# Patient Record
Sex: MALE | Race: BLACK OR AFRICAN AMERICAN | NOT HISPANIC OR LATINO | Employment: UNEMPLOYED | URBAN - METROPOLITAN AREA
[De-identification: names, ages, dates, MRNs, and addresses within clinical notes are randomized per-mention and may not be internally consistent; named-entity substitution may affect disease eponyms.]

---

## 2020-09-14 ENCOUNTER — HOSPITAL ENCOUNTER (OUTPATIENT)
Dept: RADIOLOGY | Facility: OTHER | Age: 25
Discharge: HOME | End: 2020-09-14
Attending: ORTHOPAEDIC SURGERY
Payer: COMMERCIAL

## 2020-09-14 ENCOUNTER — OFFICE VISIT (OUTPATIENT)
Dept: ORTHOPEDICS | Facility: CLINIC | Age: 25
End: 2020-09-14
Payer: COMMERCIAL

## 2020-09-14 ENCOUNTER — TELEPHONE (OUTPATIENT)
Dept: ORTHOPEDICS | Facility: CLINIC | Age: 25
End: 2020-09-14

## 2020-09-14 VITALS
DIASTOLIC BLOOD PRESSURE: 78 MMHG | HEIGHT: 69 IN | WEIGHT: 142 LBS | HEART RATE: 71 BPM | BODY MASS INDEX: 21.03 KG/M2 | SYSTOLIC BLOOD PRESSURE: 121 MMHG

## 2020-09-14 DIAGNOSIS — R22.32 MASS OF LEFT HAND: Primary | ICD-10-CM

## 2020-09-14 DIAGNOSIS — S68.412A: ICD-10-CM

## 2020-09-14 DIAGNOSIS — S68.412A: Primary | ICD-10-CM

## 2020-09-14 PROCEDURE — 99204 OFFICE O/P NEW MOD 45 MIN: CPT | Mod: S$GLB,,, | Performed by: ORTHOPAEDIC SURGERY

## 2020-09-14 PROCEDURE — 3008F BODY MASS INDEX DOCD: CPT | Mod: CPTII,S$GLB,, | Performed by: ORTHOPAEDIC SURGERY

## 2020-09-14 PROCEDURE — 73130 XR HAND COMPLETE 3 VIEW LEFT: ICD-10-PCS | Mod: 26,LT,, | Performed by: RADIOLOGY

## 2020-09-14 PROCEDURE — 73130 X-RAY EXAM OF HAND: CPT | Mod: 26,LT,, | Performed by: RADIOLOGY

## 2020-09-14 PROCEDURE — 99999 PR PBB SHADOW E&M-EST. PATIENT-LVL III: CPT | Mod: PBBFAC,,, | Performed by: ORTHOPAEDIC SURGERY

## 2020-09-14 PROCEDURE — 99999 PR PBB SHADOW E&M-EST. PATIENT-LVL III: ICD-10-PCS | Mod: PBBFAC,,, | Performed by: ORTHOPAEDIC SURGERY

## 2020-09-14 PROCEDURE — 99204 PR OFFICE/OUTPT VISIT, NEW, LEVL IV, 45-59 MIN: ICD-10-PCS | Mod: S$GLB,,, | Performed by: ORTHOPAEDIC SURGERY

## 2020-09-14 PROCEDURE — 3008F PR BODY MASS INDEX (BMI) DOCUMENTED: ICD-10-PCS | Mod: CPTII,S$GLB,, | Performed by: ORTHOPAEDIC SURGERY

## 2020-09-14 PROCEDURE — 73130 X-RAY EXAM OF HAND: CPT | Mod: TC,FY,LT

## 2020-09-14 NOTE — TELEPHONE ENCOUNTER
Called patient 2x, unable to LVM. Sent portal message regarding no insurance coverage.    Chioma Palacios MS, OT  Orthopedic Clinical Assistant to Dr. Ross Dunbar [facility]

## 2020-09-14 NOTE — PROGRESS NOTES
"[facility]  History & Physical  Orthopedics    SUBJECTIVE:      COVID-19 attestation:  This patient was treated during the COVID-19 pandemic.  This was discussed with the patient, they are aware of our current policies and procedures, were given the option of delaying their visit and or switching to a virtual visit, delaying their surgery when applicable, and they elect to proceed.    Chief Complaint: left first webspace mass     Referring Provider: Self, Aaareferral     History of Present Illness:  Patient is a 25 y.o. right hand dominant male who presents today with complaints of left first webspace swelling. Patient endorses an atraumatic swelling over the first webspace of left hand over past 2 years with slow progression. Endorses limitations in thumb ROM and paraesthesias involving the ulnar aspect of his left thumb that recently began over the past few months. Denies any pain from this mass. Denies any constitutional or B symptoms including denial of any recent weightloss or weightgain. He has not tried any treatments for this issue and does endorse seeing a doctor 4 years ago that he recognized a "spur" of bone over the thumb metacarpal that he endorsed doing nothing about, but he endorses that there was no swelling or palpable mass in his L 1st webspace at that time.     The patient is a/an pianist/huerta.    Onset of symptoms/DOI was 2 years ago.    Symptoms are aggravated by activity.    Symptoms are alleviated by none.    Symptoms consist of swelling and decreased ROM.    The patient rates their pain as a 0/10.    Attempted treatment(s) and/or interventions include rest.     The patient denies any fevers, chills, N/V, D/C and presents for evaluation.       No past medical history on file.  No past surgical history on file.  Review of patient's allergies indicates:  No Known Allergies  Social History     Social History Narrative    Not on file     No family history on file.    No " "current outpatient medications on file.      Review of Systems:  Constitutional: no fever or chills  Eyes: no visual changes  ENT: no nasal congestion or sore throat  Respiratory: no cough or shortness of breath  Cardiovascular: no chest pain  Gastrointestinal: no nausea or vomiting, tolerating diet  Musculoskeletal: decreased ROM    OBJECTIVE:      Vital Signs (Most Recent):  Vitals:    09/14/20 1140   BP: 121/78   Pulse: 71   Weight: 64.4 kg (142 lb)   Height: 5' 9" (1.753 m)     Body mass index is 20.97 kg/m².      Physical Exam:  Constitutional: The patient appears well-developed and well-nourished. No distress.   Head: Normocephalic and atraumatic.   Nose: Nose normal.   Eyes: Conjunctivae and EOM are normal.   Neck: No tracheal deviation present.   Cardiovascular: Normal rate and intact distal pulses.    Pulmonary/Chest: Effort normal. No respiratory distress.   Abdominal: There is no guarding.   Neurological: The patient is alert.   Psychiatric: The patient has a normal mood and affect.     Left Hand/Wrist Examination:    Observation/Inspection:  Swelling  See below  Deformity  See below  Discoloration  none     Scars   none    Atrophy  None  Hard palpable lobulated mass nearly 7cm in diameter involving the entire 1st webspace that is nonTTP. This mass doesn't jordyn and doesn't transilluminate on light exam. There is no crepitus on exam. He is able to interdigitate his thumb and his SF, however is unable to have his thumb touch the flexion crease of the MCPJ of SF. He does have paraesthesias over the ulnar aspect of his thumb. Intact EPL/FPL/Adductor pollicis and full without pain.     HAND/WRIST EXAMINATION:  Finkelstein's Test   Neg  WHAT Test    Neg  Snuff box tenderness   Neg  Pineda's Test    Neg  Hook of Hamate Tenderness  Neg  CMC grind    Neg  Circumduction test   Neg    Neurovascular Exam:  Digits WWP, brisk CR < 3s throughout  NVI motor/LTS to M/R/U nerves, radial pulse 2+  Tinel's Test - Carpal " Tunnel  Neg  Tinel's Test - Cubital Tunnel  Neg  Phalen's Test    Neg  Median Nerve Compression Test Neg    ROM hand/wrist/elbow full, painless    RRR  CTAB  Abd S/NT/ND +BS    Diagnostic Results:     Xray - left hand with soft tissue mass and coarse calcification apparently emanating from the 1st metacarpal into the 1st-2nd metacarpal interspace  EMG - none    ASSESSMENT/PLAN:      Matt Encinas is a 25 y.o. male with soft tissue mass involving the L hand first webspace concerning for bony oncologic pathology.   Plan:  The size and progression of the patients mass in his L hand was discussed with the patient and the concern for possible cancer was brought up. Discussed with the patient that an MRI is indicated in this case to better characterize the mass. Will plan for patient to RTC upon completion of his MRI. Will also plan to discuss these findings with Dr. Jaime Dumas whom is an orthopedic oncologist involved in the [facility] practice upon completion if there is concern for oncologic pathology.   1) MRI L hand w/ and w/out contrast ordered  2) RTC upon completion of MRI      Sahil Gunter M.D.     Please be aware that this note has been generated with the assistance of honey voice-to-text.  Please excuse any spelling or grammatical errors.

## 2020-09-15 ENCOUNTER — TELEPHONE (OUTPATIENT)
Dept: ORTHOPEDICS | Facility: CLINIC | Age: 25
End: 2020-09-15

## 2020-09-15 NOTE — TELEPHONE ENCOUNTER
Attempted to call patient to arrange for a sooner MRI than currently scheduled.  We will continue to try and move his MRI to a sooner date.  No message left, voicemail full.

## 2020-09-19 ENCOUNTER — HOSPITAL ENCOUNTER (OUTPATIENT)
Dept: RADIOLOGY | Facility: HOSPITAL | Age: 25
Discharge: HOME | End: 2020-09-19
Attending: ORTHOPAEDIC SURGERY
Payer: COMMERCIAL

## 2020-09-19 ENCOUNTER — HOSPITAL ENCOUNTER (OUTPATIENT)
Dept: RADIOLOGY | Facility: HOSPITAL | Age: 25
Discharge: HOME | End: 2020-09-19
Attending: STUDENT IN AN ORGANIZED HEALTH CARE EDUCATION/TRAINING PROGRAM
Payer: COMMERCIAL

## 2020-09-19 DIAGNOSIS — R22.32 MASS OF LEFT HAND: ICD-10-CM

## 2020-09-19 PROCEDURE — 71250 CT THORAX DX C-: CPT | Mod: TC

## 2020-09-19 PROCEDURE — A9585 GADOBUTROL INJECTION: HCPCS | Performed by: STUDENT IN AN ORGANIZED HEALTH CARE EDUCATION/TRAINING PROGRAM

## 2020-09-19 PROCEDURE — 73220 MRI UPPR EXTREMITY W/O&W/DYE: CPT | Mod: TC,LT

## 2020-09-19 PROCEDURE — 73200 CT UPPER EXTREMITY W/O DYE: CPT | Mod: 26,LT,, | Performed by: RADIOLOGY

## 2020-09-19 PROCEDURE — 73220 MRI UPPR EXTREMITY W/O&W/DYE: CPT | Mod: 26,LT,, | Performed by: RADIOLOGY

## 2020-09-19 PROCEDURE — 71250 CT THORAX DX C-: CPT | Mod: 26,,, | Performed by: RADIOLOGY

## 2020-09-19 PROCEDURE — 25500020 PHARM REV CODE 255: Performed by: STUDENT IN AN ORGANIZED HEALTH CARE EDUCATION/TRAINING PROGRAM

## 2020-09-19 PROCEDURE — 73200 CT HAND WITHOUT CONTRAST LEFT: ICD-10-PCS | Mod: 26,LT,, | Performed by: RADIOLOGY

## 2020-09-19 PROCEDURE — 73220 MRI HAND FINGERS W WO CONTRAST LEFT: ICD-10-PCS | Mod: 26,LT,, | Performed by: RADIOLOGY

## 2020-09-19 PROCEDURE — 71250 CT CHEST WITHOUT CONTRAST: ICD-10-PCS | Mod: 26,,, | Performed by: RADIOLOGY

## 2020-09-19 PROCEDURE — 73200 CT UPPER EXTREMITY W/O DYE: CPT | Mod: TC,LT

## 2020-09-19 RX ORDER — GADOBUTROL 604.72 MG/ML
7 INJECTION INTRAVENOUS
Status: COMPLETED | OUTPATIENT
Start: 2020-09-19 | End: 2020-09-19

## 2020-09-19 RX ADMIN — GADOBUTROL 7 ML: 604.72 INJECTION INTRAVENOUS at 09:09

## 2020-09-21 ENCOUNTER — OFFICE VISIT (OUTPATIENT)
Dept: ORTHOPEDICS | Facility: CLINIC | Age: 25
End: 2020-09-21
Payer: COMMERCIAL

## 2020-09-21 VITALS
DIASTOLIC BLOOD PRESSURE: 71 MMHG | SYSTOLIC BLOOD PRESSURE: 109 MMHG | HEIGHT: 69 IN | WEIGHT: 142 LBS | BODY MASS INDEX: 21.03 KG/M2 | HEART RATE: 61 BPM

## 2020-09-21 DIAGNOSIS — C41.9 CHONDROSARCOMA: Primary | ICD-10-CM

## 2020-09-21 PROCEDURE — 99214 PR OFFICE/OUTPT VISIT, EST, LEVL IV, 30-39 MIN: ICD-10-PCS | Mod: S$GLB,,, | Performed by: ORTHOPAEDIC SURGERY

## 2020-09-21 PROCEDURE — 3008F PR BODY MASS INDEX (BMI) DOCUMENTED: ICD-10-PCS | Mod: CPTII,S$GLB,, | Performed by: ORTHOPAEDIC SURGERY

## 2020-09-21 PROCEDURE — 99999 PR PBB SHADOW E&M-EST. PATIENT-LVL III: CPT | Mod: PBBFAC,,, | Performed by: ORTHOPAEDIC SURGERY

## 2020-09-21 PROCEDURE — 99214 OFFICE O/P EST MOD 30 MIN: CPT | Mod: S$GLB,,, | Performed by: ORTHOPAEDIC SURGERY

## 2020-09-21 PROCEDURE — 99999 PR PBB SHADOW E&M-EST. PATIENT-LVL III: ICD-10-PCS | Mod: PBBFAC,,, | Performed by: ORTHOPAEDIC SURGERY

## 2020-09-21 PROCEDURE — 3008F BODY MASS INDEX DOCD: CPT | Mod: CPTII,S$GLB,, | Performed by: ORTHOPAEDIC SURGERY

## 2020-09-21 NOTE — PROGRESS NOTES
[facility]  History & Physical  Orthopedics    SUBJECTIVE:      COVID-19 attestation:  This patient was treated during the COVID-19 pandemic.  This was discussed with the patient, they are aware of our current policies and procedures, were given the option of delaying their visit and or switching to a virtual visit, delaying their surgery when applicable, and they elect to proceed.    Chief Complaint: left first webspace mass     Referring Provider: No ref. provider found     History of Present Illness:  Patient is a 25 y.o. right hand dominant male who presents today with complaints of left first webspace swelling. Patient endorses an atraumatic swelling over the first webspace of left hand over past 2-3 years with slow progression. Endorses limitations in thumb ROM and paraesthesias involving the ulnar aspect of his left thumb that recently began over the past few months. Denies any pain from this mass. Denies any constitutional or B symptoms including denial of any recent weightloss or weightgain. He has not tried any treatments for this issue.  He was recently referred for advanced imaging and presents today for these results and follow-up.  He denies any interval changes since his last evaluation.    The patient is a/an pianist/huerta.    Onset of symptoms/DOI was 2 years ago.    Symptoms are aggravated by activity.    Symptoms are alleviated by none.    Symptoms consist of swelling and decreased ROM.    The patient rates their pain as a 0/10.    Attempted treatment(s) and/or interventions include rest.     The patient denies any fevers, chills, N/V, D/C and presents for evaluation.       No past medical history on file.  No past surgical history on file.  Review of patient's allergies indicates:  No Known Allergies  Social History     Social History Narrative    Not on file     No family history on file.    No current outpatient medications on file.      Review of  "Systems:  Constitutional: no fever or chills  Eyes: no visual changes  ENT: no nasal congestion or sore throat  Respiratory: no cough or shortness of breath  Cardiovascular: no chest pain  Gastrointestinal: no nausea or vomiting, tolerating diet  Musculoskeletal: decreased ROM    OBJECTIVE:      Vital Signs (Most Recent):  Vitals:    09/21/20 1345   BP: 109/71   Pulse: 61   Weight: 64.4 kg (142 lb)   Height: 5' 9" (1.753 m)     Body mass index is 20.97 kg/m².      Physical Exam:  Constitutional: The patient appears well-developed and well-nourished. No distress.   Head: Normocephalic and atraumatic.   Nose: Nose normal.   Eyes: Conjunctivae and EOM are normal.   Neck: No tracheal deviation present.   Cardiovascular: Normal rate and intact distal pulses.    Pulmonary/Chest: Effort normal. No respiratory distress.   Abdominal: There is no guarding.   Neurological: The patient is alert.   Psychiatric: The patient has a normal mood and affect.     Left Hand/Wrist Examination:    Observation/Inspection:  Swelling  See below  Deformity  See below  Discoloration  none     Scars   none    Atrophy  None  Hard palpable lobulated mass nearly 7cm in diameter involving the entire 1st webspace that is nonTTP. This mass doesn't jordyn and doesn't transilluminate on light exam. There is no crepitus on exam. He is able to interdigitate his thumb and his SF, however is unable to have his thumb touch the flexion crease of the MCPJ of SF. He does have paraesthesias over the ulnar aspect of his thumb. Intact EPL/FPL/Adductor pollicis and full without pain.     HAND/WRIST EXAMINATION:  Finkelstein's Test   Neg  WHAT Test    Neg  Snuff box tenderness   Neg  Pineda's Test    Neg  Hook of Hamate Tenderness  Neg  CMC grind    Neg  Circumduction test   Neg    Neurovascular Exam:  Digits WWP, brisk CR < 3s throughout  NVI motor/LTS to M/R/U nerves, radial pulse 2+  Tinel's Test - Carpal Tunnel  Neg  Tinel's Test - Cubital Tunnel  Neg  Phalen's " Test    Neg  Median Nerve Compression Test Neg    ROM hand/wrist/elbow full, painless    RRR  CTAB  Abd S/NT/ND +BS    Diagnostic Results:     Xray - left hand with soft tissue mass and coarse calcification apparently emanating from the 1st metacarpal into the 1st-2nd metacarpal interspace    EMG - none    CT chest-  Impression:   No obvious evidence of an acute pulmonary process.   Mild emphysematous changes noted involving traction bronchiectasis.   Early development of a right-sided kidney stone measuring approximately 4.7 mm.    CT left hand-  Mass within the 1st web space concerning for malignant degeneration of an osteochondroma, other soft tissues are, not excluded.    MRI left hand with and without contrast-  Impression:   Abnormal soft tissue mass within the 1st web space concerning for possible malignant degeneration of an osteochondroma, osteosarcoma, other soft tissue sarcoma not excluded.  Further evaluation advised.      ASSESSMENT/PLAN:      Matt Encinas is a 25 y.o. male with left 1st webspace mass   Plan:  Discussion held with the patient today regarding his mass.  There is intense cartilaginous signal on his imaging and I am suspicious for a chondrosarcoma or other malignancy.  I informed him of the possibility for this mass to be cancerous.  We discussed the possibility of amputation, ray resection, the potential need for extensive reconstruction, and potentially chemotherapy/radiation.  I will refer the patient to Dr. Jaime Dumas for an urgent appointment and additional planning moving forward.  This will likely involve a multi disciplinary surgical approach.  Planning ongoing.  Will await Dr. Dumas's recommendations and additional planning pending his appointment with [facility], and I will certainly remain available for any assistance in this case.      Sahil Gunter M.D.     Please be aware that this note has been generated with the assistance of Cristina voice-to-text.  Please excuse  any spelling or grammatical errors.

## 2020-09-24 ENCOUNTER — TELEPHONE (OUTPATIENT)
Dept: ORTHOPEDICS | Facility: CLINIC | Age: 25
End: 2020-09-24

## 2020-10-30 ENCOUNTER — OFFICE VISIT (OUTPATIENT)
Dept: ORTHOPEDICS | Facility: CLINIC | Age: 25
End: 2020-10-30
Payer: COMMERCIAL

## 2020-10-30 VITALS
WEIGHT: 152.75 LBS | BODY MASS INDEX: 22.63 KG/M2 | DIASTOLIC BLOOD PRESSURE: 86 MMHG | SYSTOLIC BLOOD PRESSURE: 140 MMHG | HEIGHT: 69 IN | HEART RATE: 69 BPM

## 2020-10-30 DIAGNOSIS — C41.9 CHONDROSARCOMA: ICD-10-CM

## 2020-10-30 DIAGNOSIS — R20.2 PARESTHESIAS IN LEFT HAND: ICD-10-CM

## 2020-10-30 DIAGNOSIS — C40.00: ICD-10-CM

## 2020-10-30 DIAGNOSIS — D48.0 NEOPLASM OF UNCERTAIN BEHAVIOR OF BONE AND ARTICULAR CARTILAGE: ICD-10-CM

## 2020-10-30 DIAGNOSIS — D16.12: ICD-10-CM

## 2020-10-30 PROCEDURE — 99204 OFFICE O/P NEW MOD 45 MIN: CPT | Mod: S$GLB,,, | Performed by: ORTHOPAEDIC SURGERY

## 2020-10-30 PROCEDURE — 99999 PR PBB SHADOW E&M-EST. PATIENT-LVL III: ICD-10-PCS | Mod: PBBFAC,,, | Performed by: ORTHOPAEDIC SURGERY

## 2020-10-30 PROCEDURE — 99999 PR PBB SHADOW E&M-EST. PATIENT-LVL III: CPT | Mod: PBBFAC,,, | Performed by: ORTHOPAEDIC SURGERY

## 2020-10-30 PROCEDURE — 3008F BODY MASS INDEX DOCD: CPT | Mod: CPTII,S$GLB,, | Performed by: ORTHOPAEDIC SURGERY

## 2020-10-30 PROCEDURE — 3008F PR BODY MASS INDEX (BMI) DOCUMENTED: ICD-10-PCS | Mod: CPTII,S$GLB,, | Performed by: ORTHOPAEDIC SURGERY

## 2020-10-30 PROCEDURE — 99204 PR OFFICE/OUTPT VISIT, NEW, LEVL IV, 45-59 MIN: ICD-10-PCS | Mod: S$GLB,,, | Performed by: ORTHOPAEDIC SURGERY

## 2020-10-30 NOTE — LETTER
October 30, 2020      Sahil Gunter MD  [address]  [address]  [facility] [address]           [facility]o 3rd Fl  [address]  [facility] [address]  Phone: [phone]  Fax: [phone]          Patient: Matt Encinas   MR Number: [id number]   YOB: 1995   Date of Visit: 10/30/2020       Dear Dr. Sahil Gunter:    Thank you for referring Matt Encinas to me for evaluation. Attached you will find relevant portions of my assessment and plan of care.    If you have questions, please do not hesitate to call me. I look forward to following Matt Encinas along with you.    Sincerely,    Jaime Dumas MD    Enclosure  CC:  No Recipients    If you would like to receive this communication electronically, please contact [email or web address] or [phone] to request more information on [employer] Link access.    For providers and/or their staff who would like to refer a patient to [facility], please contact us through our one-stop-shop provider referral line, [facility], at [phone].    If you feel you have received this communication in error or would no longer like to receive these types of communications, please e-mail [email or web address]

## 2020-10-30 NOTE — PROGRESS NOTES
Subjective:     HPI:   Matt Encinas is a 25 y.o. male who presents left hand mass    Referred by colleague Dr. Gunter.     RHD, left first webspace mass present for over 2 years.  Recently however is causing limitations in thumb ROM and paraesthesias over ulnar region of L thumb.    No trauma.  No other lesions or history of MHE.    CT hand 9/19/20  FINDINGS:  There is a sizable soft tissue mass centered within the 1st web space measuring approximately 4.0 x 4.3 by 5.3 cm.  It has mixed attenuation, punctate areas of calcification.  There is an osseous spur arising from the proximal 1st metacarpal which can be seen in the setting of osteochondroma.  There is no osseous erosions.  The remainder of the visualized soft tissue and osseous structures appear within normal limits.  Impression:  Mass within the 1st web space concerning for malignant degeneration of an osteochondroma, other soft tissues are, not excluded.and MRI of the hand.    MRI hand with contrast 9/19/20  FINDINGS:  There is a lobulated multi septated mass in the 1st web space measuring 3.9 cm AP x 4.4 cm transverse by 4.8 cm craniocaudad.  Mostly I so 2 low intensity signal on T1 weighted images, mostly high intensity signal on T2 weighted images.  It demonstrates circumferential and septal enhancement.  There is no apparent signal abnormality of the 1st metacarpal bone.  Very subtle osseous edema noted at the proximal 1st metacarpal bone.  The carpal tunnel appear intact.  The flexor and extensor tendons are mostly intact.  There is no myositis.  No tenosynovitis.  Impression:   Abnormal soft tissue mass within the 1st web space concerning for possible malignant degeneration of an osteochondroma, osteosarcoma, other soft tissue sarcoma not excluded.  Further evaluation advised.     Chest CT without 9/19/20  Impression:  No obvious evidence of an acute pulmonary process.  Mild emphysematous changes noted involving traction bronchiectasis.  Early  development of a right-sided kidney stone measuring approximately 4.7 mm        ROS:  The updated medical history is in the chart and has been reviewed. A review of systems is updated and there is no reported vision changes, ear/nose/mouth/throat complaints,  chest pain, shortness of breath, abdominal pain, urological complaints, fevers or chills, psychiatric complaints. Musculoskeletal and neurologcial symptoms are as documented. All other systems are negative.  Review of Systems   Constitutional: Negative for chills, fever and weight loss.   HENT: Negative for hearing loss.    Eyes: Negative for blurred vision.   Respiratory: Negative for cough and hemoptysis.    Cardiovascular: Negative for chest pain.   Gastrointestinal: Negative for nausea and vomiting.   Genitourinary: Negative for dysuria and urgency.   Musculoskeletal: Negative for myalgias.   Skin: Negative for rash.   Neurological: Positive for tingling and sensory change. Negative for weakness.   Endo/Heme/Allergies: Does not bruise/bleed easily.   Psychiatric/Behavioral: Negative.            Objective:   Exam:  Vitals:    10/30/20 1144   BP: (!) 140/86   Pulse: 69     Body mass index is 22.56 kg/m².    Physical examination included assessment of the patient's general appearance with particular attention to development, nutrition, body habitus, attention to grooming, and any evidence of distress.  Constitutional: The patient is a well-developed, well-nourished patient in no acute distress.   Cardiovascular: Vascular examination included warmth and capillary refill as well inspection for edema and assessment of pedal pulses. Pulses are palpable and regular.  Musculoskeletal: Gait was assessed as to whether it was steady, non-antalgic, and/or required the use of an assist device. The patient was also asked to walk independently and get onto the examination table.  Skin: The skin was examined for any obvious rashes or lesions in the extremity.  Neurologic:  Sensation is intact to light touch in the extremity. The patient has good coordination without hyperreflexia and is alert and oriented to person, place and time and has normal mood and affect.     All of the above were examined and found to be within normal limits except for the following pertinent clinical findings:    Right Hand Exam   Right hand exam is normal.      Left Hand Exam     Tenderness   The patient is experiencing tenderness in the dorsal area.     Range of Motion   The patient has normal left wrist ROM.  Hand   MP Thumb: normal   DIP Thumb: normal     Muscle Strength   The patient has normal left wrist strength.    Tests   Tinel's sign (median nerve): negative  Finkelstein's test: negative    Other   Sensation: decreased (ulnar side of thumb)    Comments:  Mass Characteristics:  -Mass is appreciable on gross exam over the first dorsal phalanx and webspace  -Overlying skin is intact, mobile, well perfused  -Size plum  -Deep to fascia  -Mass is fixed  -Hard  -Nontender with palpation  -Paresthesias with percussion yes over ulnar thumb  -Transilluminates no    Intact EPL/FPL/Adductor pollicis             Specimens (From admission, onward)    None             Assessment:     No primary diagnosis found.    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      Majority of the lesion appears cartilagenous implying a very large cartilage cap on MRI, favoring low grade chondrosarcoma.  Slow progressive linear growth without exacerbation also supports low grade rather than intermediate or high grade lesion, no obvious regions of necrosis seen on imaging.  No evidence of  metastasis.    Near circumferential involvement of the thumb metacarpal, some scalloping of the ulnar border of the metacarpal but no cortical breakthrough or intramedullary involvement other than the base of the osteochondroma in the proximal metaphysis of the bone.           Plan:       Plan for hemicortical versus intercallary resection of the left first metacarpal with the associated osteochondroma/cartilagenous lesion.  Reconstruction with Iliac crest autograft and internal fixation.    No benefit to wide resection over local resection when dealing with low grade chondrosarcoma.  Biopsy is of low accuracy with these lesions.  Clinical history and imaging support proceeding with surgery.  Marginal resection first.  Discussed that if the lesion is higher grade, a wide resection / thumb amputation would likely be required for negative margins, so a marginal resection first does not contaminate or prevent a later surgery.    Will coordinate with Dr. Gunter for a dual surgery date.  Anticipate 11/17/20.  Consented today.      No orders of the defined types were placed in this encounter.      No past medical history on file.    No past surgical history on file.    No family history on file.    Social History     Socioeconomic History    Marital status: Unknown     Spouse name: Not on file    Number of children: Not on file    Years of education: Not on file    Highest education level: Not on file   Occupational History    Not on file   Social Needs    Financial resource strain: Not on file    Food insecurity     Worry: Not on file     Inability: Not on file    Transportation needs     Medical: Not on file     Non-medical: Not on file   Tobacco Use    Smoking status: Not on file   Substance and Sexual Activity    Alcohol use: Yes     Alcohol/week: 6.0 standard drinks     Types: 6 Glasses of wine per week     Frequency: 2-3 times a week     Binge frequency: Less than monthly    Drug use: Yes     Frequency:  7.0 times per week     Types: Marijuana    Sexual activity: Not on file   Lifestyle    Physical activity     Days per week: Not on file     Minutes per session: Not on file    Stress: Not on file   Relationships    Social connections     Talks on phone: Not on file     Gets together: Not on file     Attends Gnosticist service: Not on file     Active member of club or organization: Not on file     Attends meetings of clubs or organizations: Not on file     Relationship status: Not on file   Other Topics Concern    Not on file   Social History Narrative    Not on file     I spent more than 45 minutes reviewing this patient's medical records, imaging studies, and taking a full history and physical, and discussing his treatment plan and expected prognosis.  More than 50% of this time was spent face to face with the patient.

## 2020-11-02 ENCOUNTER — TELEPHONE (OUTPATIENT)
Dept: PREADMISSION TESTING | Facility: HOSPITAL | Age: 25
End: 2020-11-02

## 2020-11-02 DIAGNOSIS — Z03.818 ENCOUNTER FOR OBSERVATION FOR SUSPECTED EXPOSURE TO OTHER BIOLOGICAL AGENTS RULED OUT: ICD-10-CM

## 2020-11-02 DIAGNOSIS — D16.9: Primary | ICD-10-CM

## 2020-11-02 NOTE — TELEPHONE ENCOUNTER
----- Message from Sera Gibbons LPN sent at 10/30/2020  4:58 PM CDT -----  Regarding: medical clearance  Hi !  Mr Encinas   needs clearance for general anesthesia  he's having a tumor removed from  his right hand on November 17   Thank You, sera

## 2020-11-03 ENCOUNTER — TELEPHONE (OUTPATIENT)
Dept: ORTHOPEDICS | Facility: CLINIC | Age: 25
End: 2020-11-03

## 2020-11-05 DIAGNOSIS — C41.9 CHONDROSARCOMA: ICD-10-CM

## 2020-11-05 DIAGNOSIS — C40.00: Primary | ICD-10-CM

## 2020-11-05 NOTE — PROGRESS NOTES
Called patient and message left informing him of the need for a CT angiogram of the left hand prior to surgery.  Message left explaining this and to be on the lookout for a call to get this study scheduled.

## 2020-11-06 DIAGNOSIS — C41.9 CHONDROSARCOMA: ICD-10-CM

## 2020-11-06 DIAGNOSIS — C40.00: Primary | ICD-10-CM

## 2020-11-09 ENCOUNTER — HOSPITAL ENCOUNTER (OUTPATIENT)
Dept: RADIOLOGY | Facility: HOSPITAL | Age: 25
Discharge: HOME | End: 2020-11-09
Attending: ORTHOPAEDIC SURGERY
Payer: COMMERCIAL

## 2020-11-09 ENCOUNTER — INITIAL CONSULT (OUTPATIENT)
Dept: INTERNAL MEDICINE | Facility: CLINIC | Age: 25
End: 2020-11-09
Payer: COMMERCIAL

## 2020-11-09 VITALS
SYSTOLIC BLOOD PRESSURE: 147 MMHG | HEIGHT: 69 IN | OXYGEN SATURATION: 99 % | HEART RATE: 53 BPM | BODY MASS INDEX: 21.92 KG/M2 | TEMPERATURE: 98 F | DIASTOLIC BLOOD PRESSURE: 83 MMHG | WEIGHT: 148 LBS

## 2020-11-09 DIAGNOSIS — D69.6 THROMBOCYTOPENIA: Primary | ICD-10-CM

## 2020-11-09 DIAGNOSIS — D64.9 NORMOCYTIC ANEMIA: ICD-10-CM

## 2020-11-09 DIAGNOSIS — F12.90 MARIJUANA SMOKER: ICD-10-CM

## 2020-11-09 DIAGNOSIS — D16.12: ICD-10-CM

## 2020-11-09 DIAGNOSIS — M79.642 PAIN OF LEFT HAND: ICD-10-CM

## 2020-11-09 PROCEDURE — 99999 PR PBB SHADOW E&M-EST. PATIENT-LVL III: CPT | Mod: PBBFAC,,, | Performed by: NURSE PRACTITIONER

## 2020-11-09 PROCEDURE — 3008F BODY MASS INDEX DOCD: CPT | Mod: CPTII,S$GLB,, | Performed by: NURSE PRACTITIONER

## 2020-11-09 PROCEDURE — 99203 OFFICE O/P NEW LOW 30 MIN: CPT | Mod: S$GLB,,, | Performed by: NURSE PRACTITIONER

## 2020-11-09 PROCEDURE — 99203 PR OFFICE/OUTPT VISIT, NEW, LEVL III, 30-44 MIN: ICD-10-PCS | Mod: S$GLB,,, | Performed by: NURSE PRACTITIONER

## 2020-11-09 PROCEDURE — 99999 PR PBB SHADOW E&M-EST. PATIENT-LVL III: ICD-10-PCS | Mod: PBBFAC,,, | Performed by: NURSE PRACTITIONER

## 2020-11-09 PROCEDURE — 3008F PR BODY MASS INDEX (BMI) DOCUMENTED: ICD-10-PCS | Mod: CPTII,S$GLB,, | Performed by: NURSE PRACTITIONER

## 2020-11-09 NOTE — LETTER
November 9, 2020      Jaime Dumas MD  [address] [facility] [address]           [facility]  [address] [facility] [address]  Phone: [phone]          Patient: Matt Encinas   MR Number: [id number]   YOB: 1995   Date of Visit: 11/9/2020       Dear Dr. Jaime Dumas:    Thank you for referring Matt Encinas to me for evaluation. Attached you will find relevant portions of my assessment and plan of care.    If you have questions, please do not hesitate to call me. I look forward to following Matt Encinas along with you.    Sincerely,    Micheal Michael, NP    Enclosure  CC:  No Recipients    If you would like to receive this communication electronically, please contact [email or web address] or [phone] to request more information on [employer] access.    For providers and/or their staff who would like to refer a patient to [facility], please contact us through our one-stop-shop provider referral line, [facility] , at [phone].    If you feel you have received this communication in error or would no longer like to receive these types of communications, please e-mail [email or web address]

## 2020-11-09 NOTE — PROGRESS NOTES
[facility]  Progress Note    Patient Name: Matt Encinas  MRN: [id number]  Date of Evaluation- 11/16/2020  PCP- Diane Lombardi    Future cases for Matt Encinas [[id number]]     Case ID Status Date Time Luis Procedure Provider Location    [id number] [facility] 11/17/2020  7:00  EXCISION MASS EXTREMITY LEFT THUMB/HAND      POSSIBLE LEFT ILIAC CREST AUTOGRAFT, ORIF 1ST METACARPAL Jaime Dumas MD [[id number]] [facility] OR 2ND FLR          HPI:  This is a 25 y.o. male  who presents today for a preoperative evaulation in preparation for Orthopedic  surgery. Scheduled for  excision of left thumb mass; ORIF of first metacarpal.   Patient is new to me.  Details of current problem: The duration of problem is two years. Reports symptoms of numbness to left thumb. Aggravating Factors include: decreased range of motion to left thumb. There are no relieving factors. Denies pain today.     The history has been obtained by speaking with the patient and reviewing the electronic medical record and/or outside health information. Significant health conditions for the perioperative period are discussed below in assessment and plan.     Patient reports current health status to be Good.  Denies any new symptoms before surgery.       Subjective/ Objective:     Chief Complaint: Preoperative evaulation, perioperative medical management, and complication reduction plan.     Functional Capacity:  Running- 3x weekly (2 miles)- denies CP/SOB.       Anesthesia issues: None  Difficulty mouth opening: No  Steroid use in the last 12 months: No    Dental Issues: cap - right upper    Family anesthesia difficulty: None       Active Cardiac Conditions: None    Revised Cardiac Risk Index Predictors: None       Family Hx of Thrombosis: None     History reviewed. No pertinent past medical history.      Past Medical History Pertinent Negatives:   Diagnosis Date Noted    ADHD (attention deficit hyperactivity disorder) 11/09/2020    Anemia  11/09/2020    Anxiety 11/09/2020    Asthma 11/09/2020    COPD (chronic obstructive pulmonary disease) 11/09/2020    Coronary artery disease 11/09/2020    Deep vein thrombosis 11/09/2020    Depression 11/09/2020    Diabetes mellitus, type 2 11/09/2020    Disorder of kidney and ureter 11/09/2020    GERD (gastroesophageal reflux disease) 11/09/2020    History of alcohol abuse 11/09/2020    Hyperlipidemia 11/09/2020    Hypertension 11/09/2020    Myocardial infarction 11/09/2020    Pulmonary embolism 11/09/2020    Seizures 11/09/2020    Stroke 11/09/2020    Thyroid disease 11/09/2020         History reviewed. No pertinent surgical history.    Review of Systems   Constitutional: Negative for chills, fatigue, fever and unexpected weight change.   HENT: Negative for dental problem, hearing loss, postnasal drip, rhinorrhea, sore throat, tinnitus and trouble swallowing.    Eyes: Negative for photophobia, pain, discharge and visual disturbance.   Respiratory: Negative for apnea, cough, chest tightness, shortness of breath and wheezing.    Cardiovascular: Negative for chest pain, palpitations and leg swelling.   Gastrointestinal: Negative for abdominal pain, blood in stool, constipation, nausea and vomiting.        Denies Fatty liver, Hepatitis   Endocrine: Negative for cold intolerance, heat intolerance, polydipsia, polyphagia and polyuria.   Genitourinary: Negative for decreased urine volume, difficulty urinating, dysuria, frequency, hematuria and urgency.   Musculoskeletal: Negative for arthralgias, back pain, neck pain and neck stiffness.   Skin: Negative for rash and wound.   Allergic/Immunologic: Negative for immunocompromised state.   Neurological: Positive for numbness (left thumb). Negative for dizziness, tremors, seizures, syncope, weakness and headaches.   Hematological: Negative for adenopathy. Does not bruise/bleed easily.   Psychiatric/Behavioral: Negative for confusion, hallucinations, sleep  "disturbance and suicidal ideas.        VITALS  Visit Vitals  BP (!) 147/83 (BP Location: Left arm, Patient Position: Sitting)   Pulse (!) 53   Temp 98.2 °F (36.8 °C) (Oral)   Ht 5' 9" (1.753 m)   Wt 67.1 kg (148 lb)   SpO2 99%   BMI 21.86 kg/m²        Physical Exam  Vitals signs reviewed.   Constitutional:       General: He is not in acute distress.     Appearance: He is well-developed.   HENT:      Head: Normocephalic.      Nose: Nose normal.      Mouth/Throat:      Pharynx: No oropharyngeal exudate.   Eyes:      General:         Right eye: No discharge.         Left eye: No discharge.      Conjunctiva/sclera: Conjunctivae normal.      Pupils: Pupils are equal, round, and reactive to light.   Neck:      Musculoskeletal: Normal range of motion.      Thyroid: No thyromegaly.      Vascular: No carotid bruit or JVD.      Trachea: No tracheal deviation.   Cardiovascular:      Rate and Rhythm: Normal rate and regular rhythm.      Pulses:           Carotid pulses are 2+ on the right side and 2+ on the left side.       Dorsalis pedis pulses are 2+ on the right side and 2+ on the left side.        Posterior tibial pulses are 2+ on the right side and 2+ on the left side.      Heart sounds: Normal heart sounds. No murmur.   Pulmonary:      Effort: Pulmonary effort is normal. No respiratory distress.      Breath sounds: Normal breath sounds. No stridor. No wheezing, rhonchi or rales.   Abdominal:      General: Bowel sounds are normal. There is no distension.      Palpations: Abdomen is soft.      Tenderness: There is no guarding.   Musculoskeletal:      Left hand: He exhibits decreased range of motion and swelling.   Lymphadenopathy:      Cervical: No cervical adenopathy.   Skin:     General: Skin is warm and dry.      Capillary Refill: Capillary refill takes less than 2 seconds.      Findings: No erythema or rash.   Neurological:      Mental Status: He is alert and oriented to person, place, and time.      Coordination: " Coordination normal.          Significant Labs:  Lab Results   Component Value Date    WBC 4.11 11/09/2020    HGB 13.2 (L) 11/09/2020    HCT 42.5 11/09/2020     11/16/2020    ALT 13 11/09/2020    AST 16 11/09/2020     11/09/2020    K 4.5 11/09/2020     11/09/2020    CREATININE 1.0 11/09/2020    BUN 17 11/09/2020    CO2 27 11/09/2020    INR 1.0 11/16/2020       Diagnostic Studies: No relevant studies.    EKG:   No results found for this or any previous visit.    2D ECHO:  TTE:  No results found for this or any previous visit.    ABDIFATAH:  No results found for this or any previous visit.       Imaging  CT chest 9/15/20    Impression:     No obvious evidence of an acute pulmonary process.     Mild emphysematous changes noted involving traction bronchiectasis.     Early development of a right-sided kidney stone measuring approximately 4.7 mm.        Electronically signed by: Keron Carpio  Date:                                            09/20/2020  Time:                                           09:25      Revised Cardiac Risk Index   High -Risk Surgery  Intraperitoneal; Intrathoracic; suprainguinal vascular Yes- + 1 No- 0   History of Ischemic Heart Disease   (Hx of MI/positive exercise test/current chest pain due to ischemia/use of nitrate therapy/EKG with pathological Q waves) Yes- + 1 No- 0   History of CHF  (Pulmonary edema/bilateral rales or S3 gallop/PND/CXR showing pulmonary vascular redistribution) Yes- + 1 No- 0   History of CVA   (Prior stroke or TIA) Yes- + 1 No- 0   Pre-operative treatment with insulin Yes- + 1 No- 0   Pre-operative creatinine > 2mg/dl Yes- + 1 No- 0   Total: 0      Risk Status:  Estimated risk of cardiac complications after non-cardiac surgery using the Revised Cardiac Risk Index for Preoperative risk is 3.9 %      ARISCAT (Canet) risk index: Low; 1.6 % risk of post-op pulmonary complications.    American Society of Anesthesiologists Physical Status classification (ASA):  2    [facility] respiratory failure index: 0.5 %           No further cardiac workup needed prior to surgery.      Orders Placed This Encounter    Comprehensive Metabolic Panel    CBC Auto Differential    PLATELET COUNT    Protime-INR       Assessment/Plan:     Osteochondroma of hand, left  Scheduled for excision with Dr. Dumas 11/17/20.    Normocytic anemia  Current labs: Hgb= 13.2; Hct is normal- 42.5.      Denies:weakness, fatigue, exercise intolerance or CP/SOB.   Suggest monitoring during the perioperative period.    Thrombocytopenia  Platelet count: 142 .Will repeat and order an INR as well.  No bleeding episodes; denies liver issues. Reports having at least 4-6 drinks weekly. Will abstain from alcohol during the perioperative period.     Marijuana smoker  Advised to quit smoking to decrease risk of infection and delayed healing. There is an increased risk for blood clots, cardiovascular and pulmonary complications.         Discussion/Management of Perioperative Care    Thromboembolic prophylaxis (VTE) Care: Risk factors for thrombosis include: surgical procedure.  I recommend prophylaxis of thromboembolism with the use of compression stockings/pneumatic devices, and/or pharmacologic agents. The benefits should outweigh the risks for pharmacologic prophylaxis in the perioperative period. I also encourage early ambulation if not contraindicated during the post-operative period.    To reduce the risk of pulmonary complications, prophylactic recommendations include: use of short acting beta agonist within 30 minutes of intubation, incentive spirometry use/deep breathing, end tidal carbon dioxide monitoring and pain control.    To reduce the risk of postoperative renal complications, I recommend the patient maintain adequate fluid volume status by drinking 2 liters of water daily.  Avoid/reduce NSAIDS and MORROW-2 inhibitors use as well as IV contrast for renal protection.    I recommend the use of appropriate  prophylactic antibiotics to reduce the risk of surgical site infections.      This visit was focused on Preoperative evaluation, Perioperative Medical management, complication reduction plans. I suggest that the patient follows up with primary care or relevant sub specialists for ongoing health care.    I appreciate the opportunity to be involved in this patients care. Please feel free to contact me if there were any questions about this consultation.    Patient is optimized for surgery with Dr. Dumas.    Repeat Platelet count and PT/INR 11/16/20.    11/16/20: Platelet count and INR is  normal.     Micheal Michael NP  Perioperative Medicine  [facility]

## 2020-11-09 NOTE — OUTPATIENT SUBJECTIVE & OBJECTIVE
Outpatient Subjective & Objective      Chief Complaint: Preoperative evaulation, perioperative medical management, and complication reduction plan.     Functional Capacity:  Running- 3x weekly (2 miles)- denies CP/SOB.       Anesthesia issues: None  Difficulty mouth opening: No  Steroid use in the last 12 months: No    Dental Issues: cap - right upper    Family anesthesia difficulty: None       Active Cardiac Conditions: None    Revised Cardiac Risk Index Predictors: None       Family Hx of Thrombosis: None     History reviewed. No pertinent past medical history.      Past Medical History Pertinent Negatives:   Diagnosis Date Noted    ADHD (attention deficit hyperactivity disorder) 11/09/2020    Anemia 11/09/2020    Anxiety 11/09/2020    Asthma 11/09/2020    COPD (chronic obstructive pulmonary disease) 11/09/2020    Coronary artery disease 11/09/2020    Deep vein thrombosis 11/09/2020    Depression 11/09/2020    Diabetes mellitus, type 2 11/09/2020    Disorder of kidney and ureter 11/09/2020    GERD (gastroesophageal reflux disease) 11/09/2020    History of alcohol abuse 11/09/2020    Hyperlipidemia 11/09/2020    Hypertension 11/09/2020    Myocardial infarction 11/09/2020    Pulmonary embolism 11/09/2020    Seizures 11/09/2020    Stroke 11/09/2020    Thyroid disease 11/09/2020         History reviewed. No pertinent surgical history.    Review of Systems   Constitutional: Negative for chills, fatigue, fever and unexpected weight change.   HENT: Negative for dental problem, hearing loss, postnasal drip, rhinorrhea, sore throat, tinnitus and trouble swallowing.    Eyes: Negative for photophobia, pain, discharge and visual disturbance.   Respiratory: Negative for apnea, cough, chest tightness, shortness of breath and wheezing.    Cardiovascular: Negative for chest pain, palpitations and leg swelling.   Gastrointestinal: Negative for abdominal pain, blood in stool, constipation, nausea and vomiting.     "    Denies Fatty liver, Hepatitis   Endocrine: Negative for cold intolerance, heat intolerance, polydipsia, polyphagia and polyuria.   Genitourinary: Negative for decreased urine volume, difficulty urinating, dysuria, frequency, hematuria and urgency.   Musculoskeletal: Negative for arthralgias, back pain, neck pain and neck stiffness.   Skin: Negative for rash and wound.   Allergic/Immunologic: Negative for immunocompromised state.   Neurological: Positive for numbness (left thumb). Negative for dizziness, tremors, seizures, syncope, weakness and headaches.   Hematological: Negative for adenopathy. Does not bruise/bleed easily.   Psychiatric/Behavioral: Negative for confusion, hallucinations, sleep disturbance and suicidal ideas.        VITALS  Visit Vitals  BP (!) 147/83 (BP Location: Left arm, Patient Position: Sitting)   Pulse (!) 53   Temp 98.2 °F (36.8 °C) (Oral)   Ht 5' 9" (1.753 m)   Wt 67.1 kg (148 lb)   SpO2 99%   BMI 21.86 kg/m²        Physical Exam  Vitals signs reviewed.   Constitutional:       General: He is not in acute distress.     Appearance: He is well-developed.   HENT:      Head: Normocephalic.      Nose: Nose normal.      Mouth/Throat:      Pharynx: No oropharyngeal exudate.   Eyes:      General:         Right eye: No discharge.         Left eye: No discharge.      Conjunctiva/sclera: Conjunctivae normal.      Pupils: Pupils are equal, round, and reactive to light.   Neck:      Musculoskeletal: Normal range of motion.      Thyroid: No thyromegaly.      Vascular: No carotid bruit or JVD.      Trachea: No tracheal deviation.   Cardiovascular:      Rate and Rhythm: Normal rate and regular rhythm.      Pulses:           Carotid pulses are 2+ on the right side and 2+ on the left side.       Dorsalis pedis pulses are 2+ on the right side and 2+ on the left side.        Posterior tibial pulses are 2+ on the right side and 2+ on the left side.      Heart sounds: Normal heart sounds. No murmur. "   Pulmonary:      Effort: Pulmonary effort is normal. No respiratory distress.      Breath sounds: Normal breath sounds. No stridor. No wheezing, rhonchi or rales.   Abdominal:      General: Bowel sounds are normal. There is no distension.      Palpations: Abdomen is soft.      Tenderness: There is no guarding.   Musculoskeletal:      Left hand: He exhibits decreased range of motion and swelling.   Lymphadenopathy:      Cervical: No cervical adenopathy.   Skin:     General: Skin is warm and dry.      Capillary Refill: Capillary refill takes less than 2 seconds.      Findings: No erythema or rash.   Neurological:      Mental Status: He is alert and oriented to person, place, and time.      Coordination: Coordination normal.          Significant Labs:  Lab Results   Component Value Date    WBC 4.11 11/09/2020    HGB 13.2 (L) 11/09/2020    HCT 42.5 11/09/2020     11/16/2020    ALT 13 11/09/2020    AST 16 11/09/2020     11/09/2020    K 4.5 11/09/2020     11/09/2020    CREATININE 1.0 11/09/2020    BUN 17 11/09/2020    CO2 27 11/09/2020    INR 1.0 11/16/2020       Diagnostic Studies: No relevant studies.    EKG:   No results found for this or any previous visit.    2D ECHO:  TTE:  No results found for this or any previous visit.    ABDIFATAH:  No results found for this or any previous visit.       Imaging  CT chest 9/15/20    Impression:     No obvious evidence of an acute pulmonary process.     Mild emphysematous changes noted involving traction bronchiectasis.     Early development of a right-sided kidney stone measuring approximately 4.7 mm.        Electronically signed by: Keron Carpio  Date:                                            09/20/2020  Time:                                           09:25      Revised Cardiac Risk Index   High -Risk Surgery  Intraperitoneal; Intrathoracic; suprainguinal vascular Yes- + 1 No- 0   History of Ischemic Heart Disease   (Hx of MI/positive exercise test/current  chest pain due to ischemia/use of nitrate therapy/EKG with pathological Q waves) Yes- + 1 No- 0   History of CHF  (Pulmonary edema/bilateral rales or S3 gallop/PND/CXR showing pulmonary vascular redistribution) Yes- + 1 No- 0   History of CVA   (Prior stroke or TIA) Yes- + 1 No- 0   Pre-operative treatment with insulin Yes- + 1 No- 0   Pre-operative creatinine > 2mg/dl Yes- + 1 No- 0   Total: 0      Risk Status:  Estimated risk of cardiac complications after non-cardiac surgery using the Revised Cardiac Risk Index for Preoperative risk is 3.9 %      ARISCAT (Canet) risk index: Low; 1.6 % risk of post-op pulmonary complications.    American Society of Anesthesiologists Physical Status classification (ASA): 2    [facility]: 0.5 %           No further cardiac workup needed prior to surgery.    Outpatient Subjective & Objective

## 2020-11-09 NOTE — HPI
This is a 25 y.o. male  who presents today for a preoperative evaulation in preparation for Orthopedic  surgery. Scheduled for  excision of left thumb mass; ORIF of first metacarpal.   Patient is new to me.  Details of current problem: The duration of problem is two years. Reports symptoms of numbness to left thumb. Aggravating Factors include: decreased range of motion to left thumb. There are no relieving factors. Denies pain today.     The history has been obtained by speaking with the patient and reviewing the electronic medical record and/or outside health information. Significant health conditions for the perioperative period are discussed below in assessment and plan.     Patient reports current health status to be Good.  Denies any new symptoms before surgery.

## 2020-11-09 NOTE — PATIENT INSTRUCTIONS
Your surgery has been scheduled for:_11/17/20_    You should report to:  ____[facility], located on the [place] side of the first floor of the           [facility] ([phone])  __X__[facility], located on the [facility] side of the            Second floor of the [facility] ([phone])  ____3rd Floor [facility] located on the [facility] side of the [facility] [phone]  ____[facility]: located at [address] [place] [address]. Check in on the first floor of the hospital.  Please Note   - Tell your doctor if you take Aspirin, products containing Aspirin, herbal medications  or blood thinners, such as Coumadin, Ticlid, or Plavix.  (Consult your provider regarding holding or stopping before surgery).  - Arrange for someone to drive you home following surgery.  You will not be allowed to leave the surgical facility alone or drive yourself home following sedation and anesthesia.  Before Surgery  - Stop taking all herbal medications and vitamins 7 days prior to surgery  - No Motrin/Advil (Ibuprofen) 7 day before surgery  - No Aleve (Naproxen) 7 days before surgery  - Stop Taking Asprin, products containing Asprin __days before surgery  - Stop taking blood thinners__days before surgery  - No Goody's/BC  Powder 7 days before surgery  - Refrain from drinking alcoholic beverages for 24hours before and after surgery  - Stop or limit smoking _14_days before surgery  - You may take Tylenol for pain  Night before Surgery  - Do not eat or drink after midnight  - Take a shower or bath (shower is recommended).  Bathe with Hibiclens soap or an antibacterial soap from the neck down.  If not supplied by your surgeon, hibiclens soap will need to be purchased over the counter in pharmacy.  Rinse soap off thoroughly.  - Shampoo your hair with your regular shampoo  The Day of Surgery  - Take  another bath or shower with hibiclens or any antibacterial soap, to reduce the chance of infection.  - Take heart and blood pressure medications with a small sip of water, as advised by the perioperative team.  - Do not take fluid pills  - You may brush your teeth and rinse your mouth, but do not swall any additional water.   - Do not apply powder, body lotions or deodorant on the day of surgery.  - Do not wear moisturizer  - Wear comfortable clothes, such as a button front shirt and loose fitting pants.  - Leave all jewelry, including body piercings, and valuables at home.    - Bring any devices you will neeed after surgery such as crutches or canes.  - If you have sleep apnea, please bring your CPAP machine  In the event that your physical condition changes including the onset of a cold or respiratory illness, or if you have to delay or cancel your surgery, please notify your surgeon.   Anesthesia: General Anesthesia     You are watched continuously during your procedure by your anesthesia provider.     Youre due to have surgery. During surgery, youll be given medicine called anesthesia or anesthetic. This will keep you comfortable and pain-free. Your anesthesia provider will use general anesthesia.  What is general anesthesia?  General anesthesia puts you into a state like deep sleep. It goes into the bloodstream (IV anesthetics), into the lungs (gas anesthetics), or both. You feel nothing during the procedure. You will not remember it. During the procedure, the anesthesia provider monitors you continuously. He or she checks your heart rate and rhythm, blood pressure, breathing, and blood oxygen.  · IV anesthetics. IV anesthetics are given through an IV line in your arm. Theyre often given first. This is so you are asleep before a gas anesthetic is started. Some kinds of IV anesthetics relieve pain. Others relax you. Your doctor will decide which kind is best in your case.  · Gas anesthetics. Gas anesthetics  are breathed into the lungs. They are often used to keep you asleep. They can be given through a facemask or a tube placed in your larynx or trachea (breathing tube).  ? If you have a facemask, your anesthesia provider will most likely place it over your nose and mouth while youre still awake. Youll breathe oxygen through the mask as your IV anesthetic is started. Gas anesthetic may be added through the mask.  ? If you have a tube in the larynx or trachea, it will be inserted into your throat after youre asleep.  Anesthesia tools and medicines  You will likely have:  · IV anesthetics. These are put into an IV line into your bloodstream.  · Gas anesthetics. You breathe these anesthetics into your lungs, where they pass into your bloodstream.  · Pulse oximeter. This is a small clip that is attached to the end of your finger. This measures your blood oxygen level.  · Electrocardiography leads (electrodes). These are small sticky pads that are placed on your chest. They record your heart rate and rhythm.  · Blood pressure cuff. This reads your blood pressure.  Risks and possible complications  General anesthesia has some risks. These include:  · Breathing problems  · Nausea and vomiting  · Sore throat or hoarseness (usually temporary)  · Allergic reaction to the anesthetic  · Irregular heartbeat (rare)  · Cardiac arrest (rare)   Anesthesia safety  · Follow all instructions you are given for how long not to eat or drink before your procedure.  · Be sure your doctor knows what medicines and drugs you take. This includes over-the-counter medicines, herbs, supplements, alcohol or other drugs. You will be asked when those were last taken.  · Have an adult family member or friend drive you home after the procedure.  · For the first 24 hours after your surgery:  ? Do not drive or use heavy equipment.  ? Do not make important decisions or sign legal documents. If important decisions or signing legal documents is necessary  during the first 24 hours after surgery, have a trusted family member or spouse act on your behalf.  ? Avoid alcohol.  ? Have a responsible adult stay with you. He or she can watch for problems and help keep you safe.  Date Last Reviewed: 12/1/2016  © 6587-8332 [employer]. [address] [place] [address], [place] [address]. All rights reserved. This information is not intended as a substitute for professional medical care. Always follow your healthcare professional's instructions

## 2020-11-09 NOTE — ASSESSMENT & PLAN NOTE
Platelet count: 142 .Will repeat and order an INR as well.  No bleeding episodes; denies liver issues. Reports having at least 4-6 drinks weekly. Will abstain from alcohol during the perioperative period.

## 2020-11-09 NOTE — ASSESSMENT & PLAN NOTE
Advised to quit smoking to decrease risk of infection and delayed healing. There is an increased risk for blood clots, cardiovascular and pulmonary complications.

## 2020-11-09 NOTE — ASSESSMENT & PLAN NOTE
Current labs: Hgb= 13.2; Hct is normal- 42.5.      Denies:weakness, fatigue, exercise intolerance or CP/SOB.   Suggest monitoring during the perioperative period.

## 2020-11-11 ENCOUNTER — HOSPITAL ENCOUNTER (OUTPATIENT)
Dept: RADIOLOGY | Facility: HOSPITAL | Age: 25
Discharge: HOME | End: 2020-11-11
Attending: ORTHOPAEDIC SURGERY
Payer: COMMERCIAL

## 2020-11-11 DIAGNOSIS — C40.00: ICD-10-CM

## 2020-11-11 DIAGNOSIS — C41.9 CHONDROSARCOMA: ICD-10-CM

## 2020-11-11 PROCEDURE — 73206 CT ANGIO UPR EXTRM W/O&W/DYE: CPT | Mod: TC,LT

## 2020-11-11 PROCEDURE — 25500020 PHARM REV CODE 255: Performed by: ORTHOPAEDIC SURGERY

## 2020-11-11 PROCEDURE — 73206 CTA UPPER EXTREMITY LEFT: ICD-10-PCS | Mod: 26,LT,, | Performed by: RADIOLOGY

## 2020-11-11 PROCEDURE — 73206 CT ANGIO UPR EXTRM W/O&W/DYE: CPT | Mod: 26,LT,, | Performed by: RADIOLOGY

## 2020-11-11 RX ADMIN — IOHEXOL 100 ML: 350 INJECTION, SOLUTION INTRAVENOUS at 05:11

## 2020-11-14 ENCOUNTER — LAB VISIT (OUTPATIENT)
Dept: SPORTS MEDICINE | Facility: CLINIC | Age: 25
End: 2020-11-14
Payer: COMMERCIAL

## 2020-11-14 DIAGNOSIS — Z03.818 ENCOUNTER FOR OBSERVATION FOR SUSPECTED EXPOSURE TO OTHER BIOLOGICAL AGENTS RULED OUT: ICD-10-CM

## 2020-11-14 PROCEDURE — U0003 INFECTIOUS AGENT DETECTION BY NUCLEIC ACID (DNA OR RNA); SEVERE ACUTE RESPIRATORY SYNDROME CORONAVIRUS 2 (SARS-COV-2) (CORONAVIRUS DISEASE [COVID-19]), AMPLIFIED PROBE TECHNIQUE, MAKING USE OF HIGH THROUGHPUT TECHNOLOGIES AS DESCRIBED BY CMS-2020-01-R: HCPCS

## 2020-11-16 ENCOUNTER — LAB VISIT (OUTPATIENT)
Dept: LAB | Facility: HOSPITAL | Age: 25
End: 2020-11-16
Payer: COMMERCIAL

## 2020-11-16 ENCOUNTER — TELEPHONE (OUTPATIENT)
Dept: ORTHOPEDICS | Facility: CLINIC | Age: 25
End: 2020-11-16

## 2020-11-16 DIAGNOSIS — M79.642 PAIN OF LEFT HAND: ICD-10-CM

## 2020-11-16 DIAGNOSIS — D69.6 THROMBOCYTOPENIA: ICD-10-CM

## 2020-11-16 LAB
INR PPP: 1 (ref 0.8–1.2)
PLATELET # BLD AUTO: 150 K/UL (ref 150–350)
PMV BLD AUTO: 11.7 FL (ref 9.2–12.9)
PROTHROMBIN TIME: 10.9 SEC (ref 9–12.5)
SARS-COV-2 RNA RESP QL NAA+PROBE: NOT DETECTED

## 2020-11-16 PROCEDURE — 36415 COLL VENOUS BLD VENIPUNCTURE: CPT

## 2020-11-16 PROCEDURE — 85610 PROTHROMBIN TIME: CPT

## 2020-11-16 PROCEDURE — 85049 AUTOMATED PLATELET COUNT: CPT

## 2020-11-16 NOTE — PROGRESS NOTES
Called and held extensive discussion with patient on 11/13/2020.  Discussed that his planned tumor resection may need extensive vascular and/or soft tissue reconstruction to the thumb/hand.  Will refer patient to Dr. Montgomery at [facility] and case will be performed by Dr. Montgomery and Dr. Dumas.  I discussed this plan with the patient, Dr. Montgomery, and Dr. Dumas.  All parties in agreement, patient appreciative and all questions answered.  Will remain available for any additional needs.  We will assist the patient in obtaining all imaging studies to bring to Dr. Montgomery.

## 2020-12-04 ENCOUNTER — TELEPHONE (OUTPATIENT)
Dept: ORTHOPEDICS | Facility: CLINIC | Age: 25
End: 2020-12-04

## 2020-12-22 ENCOUNTER — OFFICE VISIT (OUTPATIENT)
Dept: PLASTIC SURGERY | Facility: CLINIC | Age: 25
End: 2020-12-22
Payer: COMMERCIAL

## 2020-12-22 VITALS
DIASTOLIC BLOOD PRESSURE: 74 MMHG | HEART RATE: 75 BPM | WEIGHT: 148 LBS | BODY MASS INDEX: 21.86 KG/M2 | SYSTOLIC BLOOD PRESSURE: 131 MMHG

## 2020-12-22 DIAGNOSIS — D16.12: Primary | ICD-10-CM

## 2020-12-22 PROCEDURE — 3008F BODY MASS INDEX DOCD: CPT | Mod: CPTII,S$GLB,, | Performed by: SURGERY

## 2020-12-22 PROCEDURE — 1126F PR PAIN SEVERITY QUANTIFIED, NO PAIN PRESENT: ICD-10-PCS | Mod: S$GLB,,, | Performed by: SURGERY

## 2020-12-22 PROCEDURE — 1126F AMNT PAIN NOTED NONE PRSNT: CPT | Mod: S$GLB,,, | Performed by: SURGERY

## 2020-12-22 PROCEDURE — 99203 OFFICE O/P NEW LOW 30 MIN: CPT | Mod: S$GLB,,, | Performed by: SURGERY

## 2020-12-22 PROCEDURE — 99203 PR OFFICE/OUTPT VISIT, NEW, LEVL III, 30-44 MIN: ICD-10-PCS | Mod: S$GLB,,, | Performed by: SURGERY

## 2020-12-22 PROCEDURE — 3008F PR BODY MASS INDEX (BMI) DOCUMENTED: ICD-10-PCS | Mod: CPTII,S$GLB,, | Performed by: SURGERY

## 2020-12-23 NOTE — PROGRESS NOTES
PLASTIC & RECONSTRUCTIVE CONSULTATION NOTE    CC  No chief complaint on file.  Possible Osteochondroma, 1st metacarpal    Referring Provider- Gideon Dumas  PCP: Diane Lombardi    HPI  History from patient, chart, referring provider  Matt Encinas is a 25 y.o. male RHD presenting with probably osteochondroma of left first metacarpal.  Dr. Dumas planning marginal resection, in either hemicortical or intercalary fashion.  Per Dr. Dumas note, plan is for amputation if there is a high grade tumor.  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  He is also a musician, singing but also playing the piano.  He recently graduated from college.    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    FH  Family History   Problem Relation Age of Onset    No Known Problems Mother     Hypertension Father     No Known Problems Sister     Glaucoma Maternal Uncle     Glaucoma Maternal Grandmother        MEDICATIONS  No outpatient medications have been marked as taking for the 12/22/20 encounter (Office Visit) with Rodrigo Noonan MD.       ALLERGIES Review of patient's allergies indicates:  No Known Allergies    SOCIAL HISTORY  Tobacco:   Social History     Tobacco Use   Smoking Status Never Smoker   Smokeless Tobacco Never Used   Tobacco Comment    marijuana     EtOH:   Social History     Substance and Sexual Activity   Alcohol Use Yes    Alcohol/week: 1.0 - 2.0 standard drinks    Types: 1 - 2 Glasses of wine per week    Frequency: 2-3 times a week    Binge frequency: Less than monthly       ROS  Pertinent otherwise negative except per HPI  and ROS      Physical Exam    Impressive soft tissue mass abutting, visible deep to the skin of the first web space  Palpable left ulnar and radial pulses  Nick's test reveals perfusion of the hand through the ulnar artery after exsanguination, compression, and release of the ulnar artery while holding pressure on the radial artery    Imaging studies- Mri and CT reviewed.  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  INFORMED CONSENT FOR SURGERY  Risks, benefits, outcomes, possible complications, perioperative restrictions, recovery, and potential disability were reviewed. Permission to obtain photographs before, during, and after surgery was also granted. Questions were answered. Written preoperative instructions and potential complications were given.    PLAN    I explained that preservation of pinch with some degree of thumb opposition and preservation of length of the ray and to achieve union are the goals of reconstruction. The likelihood of revision surgery including tendon transfers to improve function is also a possibility after this surgery.     Involvement of thumb adductors or thumb intrinsics during the resection would portend a poorer prognosis for thumb function.      Donor sites reviewed-We discussed medial femoral condyle versus serratus/rib donor sites, the relative advantage of each flap.   I would favor MFC for a unicortical defect and a serratus  rib free flap for a larger intercalary defect.If there is involvement of the proximal (basal) joint surface, he could have shortening of this ray and may need a fusion.    The possibility of stiffness, arthritis, weakness and malunion in this ray is extremely high in my opinion.  He understands this.    The most reassuring part of his disease is that this is his non dominant hand.  He has after thoughtful discussion accepted that he may not be able to enjoy fine motion in this thumb and that he may require further reconstruction at this ray should an amputation be required in the future.  In specialized surgical hands, there are other options, such as toe transfer.    I will discuss the utility of staging reconstruction to await final pathology with Dr. Dumas.  In the event that he requires proximal dissection of recipient vessels (radial artery), I would favor delaying reconstruction to verify marginal resection/low grade  to avoid seeding the dissection bed.      No further work up is required from my standpoint before proceeding with surgery    CPT 26024    CC: Dr. Dumas  CC: Dr. Montgomery, Dr. Gunter    30 minutes of face to face time, of which greater than fifty percent of the total visit was  counseling/coordinating care    Plastic & Reconstructive Surgery  [facility]  c/o Rodrigo Noonan M.D.  [facility]  Second Floor [address]  [address] [place] [address]    Work [phone]  Toll free [phone]  If no answer [phone]

## 2020-12-26 ENCOUNTER — TELEPHONE (OUTPATIENT)
Dept: GASTROENTEROLOGY | Facility: CLINIC | Age: 25
End: 2020-12-26

## 2020-12-26 DIAGNOSIS — R22.30 MASS OF HAND, UNSPECIFIED LATERALITY: Primary | ICD-10-CM

## 2021-01-14 ENCOUNTER — TELEPHONE (OUTPATIENT)
Dept: PLASTIC SURGERY | Facility: CLINIC | Age: 26
End: 2021-01-14

## 2021-01-24 ENCOUNTER — PATIENT MESSAGE (OUTPATIENT)
Dept: SURGERY | Facility: HOSPITAL | Age: 26
End: 2021-01-24

## 2021-01-25 ENCOUNTER — PATIENT MESSAGE (OUTPATIENT)
Dept: SURGERY | Facility: HOSPITAL | Age: 26
End: 2021-01-25

## 2021-02-04 ENCOUNTER — TELEPHONE (OUTPATIENT)
Dept: ORTHOPEDICS | Facility: CLINIC | Age: 26
End: 2021-02-04

## 2021-02-11 ENCOUNTER — TELEPHONE (OUTPATIENT)
Dept: ORTHOPEDICS | Facility: CLINIC | Age: 26
End: 2021-02-11

## 2021-03-13 ENCOUNTER — IMMUNIZATION (OUTPATIENT)
Dept: PRIMARY CARE CLINIC | Facility: CLINIC | Age: 26
End: 2021-03-13
Payer: COMMERCIAL

## 2021-03-13 DIAGNOSIS — Z23 NEED FOR VACCINATION: Primary | ICD-10-CM

## 2021-03-13 PROCEDURE — 91300 PR SARS-COV- 2 COVID-19 VACCINE, NO PRSV, 30MCG/0.3ML, IM: CPT | Mod: S$GLB,,, | Performed by: INTERNAL MEDICINE

## 2021-03-13 PROCEDURE — 0001A PR IMMUNIZ ADMIN, SARS-COV-2 COVID-19 VACC, 30MCG/0.3ML, 1ST DOSE: ICD-10-PCS | Mod: CV19,S$GLB,, | Performed by: INTERNAL MEDICINE

## 2021-03-13 PROCEDURE — 91300 PR SARS-COV- 2 COVID-19 VACCINE, NO PRSV, 30MCG/0.3ML, IM: ICD-10-PCS | Mod: S$GLB,,, | Performed by: INTERNAL MEDICINE

## 2021-03-13 PROCEDURE — 0001A PR IMMUNIZ ADMIN, SARS-COV-2 COVID-19 VACC, 30MCG/0.3ML, 1ST DOSE: CPT | Mod: CV19,S$GLB,, | Performed by: INTERNAL MEDICINE

## 2021-03-13 RX ADMIN — Medication 0.3 ML: at 08:03

## 2021-04-03 ENCOUNTER — IMMUNIZATION (OUTPATIENT)
Dept: PRIMARY CARE CLINIC | Facility: CLINIC | Age: 26
End: 2021-04-03
Payer: COMMERCIAL

## 2021-04-03 DIAGNOSIS — Z23 NEED FOR VACCINATION: Primary | ICD-10-CM

## 2021-04-03 PROCEDURE — 0002A PR IMMUNIZ ADMIN, SARS-COV-2 COVID-19 VACC, 30MCG/0.3ML, 2ND DOSE: ICD-10-PCS | Mod: CV19,S$GLB,, | Performed by: INTERNAL MEDICINE

## 2021-04-03 PROCEDURE — 91300 PR SARS-COV- 2 COVID-19 VACCINE, NO PRSV, 30MCG/0.3ML, IM: ICD-10-PCS | Mod: S$GLB,,, | Performed by: INTERNAL MEDICINE

## 2021-04-03 PROCEDURE — 91300 PR SARS-COV- 2 COVID-19 VACCINE, NO PRSV, 30MCG/0.3ML, IM: CPT | Mod: S$GLB,,, | Performed by: INTERNAL MEDICINE

## 2021-04-03 PROCEDURE — 0002A PR IMMUNIZ ADMIN, SARS-COV-2 COVID-19 VACC, 30MCG/0.3ML, 2ND DOSE: CPT | Mod: CV19,S$GLB,, | Performed by: INTERNAL MEDICINE

## 2021-04-03 RX ADMIN — Medication 0.3 ML: at 08:04

## 2021-11-22 ENCOUNTER — IMMUNIZATION (OUTPATIENT)
Dept: PRIMARY CARE CLINIC | Facility: CLINIC | Age: 26
End: 2021-11-22
Payer: COMMERCIAL

## 2021-11-22 DIAGNOSIS — Z23 NEED FOR VACCINATION: Primary | ICD-10-CM

## 2021-11-22 PROCEDURE — 0004A COVID-19, MRNA, LNP-S, PF, 30 MCG/0.3 ML DOSE VACCINE: CPT | Mod: CV19,PBBFAC | Performed by: INTERNAL MEDICINE

## 2023-07-21 ENCOUNTER — HOSPITAL ENCOUNTER (OUTPATIENT)
Dept: RADIOLOGY | Facility: HOSPITAL | Age: 28
Discharge: HOME | End: 2023-07-21
Attending: PREVENTIVE MEDICINE
Payer: MEDICAID

## 2023-07-21 PROCEDURE — 73120 X-RAY EXAM OF HAND: CPT | Mod: TC,LT

## 2023-07-21 PROCEDURE — 73120 X-RAY EXAM OF HAND: CPT | Mod: 26,LT,, | Performed by: RADIOLOGY

## 2023-07-21 PROCEDURE — 73120 XR HAND 2 VIEW LEFT: ICD-10-PCS | Mod: 26,LT,, | Performed by: RADIOLOGY

## 2023-07-25 DIAGNOSIS — C41.9 CHONDROSARCOMA: Primary | ICD-10-CM

## 2023-09-21 ENCOUNTER — HOSPITAL ENCOUNTER (OUTPATIENT)
Dept: RADIOLOGY | Facility: HOSPITAL | Age: 28
Discharge: HOME | End: 2023-09-21
Attending: PREVENTIVE MEDICINE
Payer: MEDICAID

## 2023-09-21 PROCEDURE — 73218 MRI HAND FINGERS WITHOUT CONTRAST LEFT: ICD-10-PCS | Mod: 26,LT,, | Performed by: RADIOLOGY

## 2023-09-21 PROCEDURE — 73218 MRI UPPER EXTREMITY W/O DYE: CPT | Mod: 26,LT,, | Performed by: RADIOLOGY

## 2023-09-21 PROCEDURE — 73218 MRI UPPER EXTREMITY W/O DYE: CPT | Mod: TC,LT
